# Patient Record
Sex: FEMALE | Race: WHITE | NOT HISPANIC OR LATINO | Employment: OTHER | RURAL
[De-identification: names, ages, dates, MRNs, and addresses within clinical notes are randomized per-mention and may not be internally consistent; named-entity substitution may affect disease eponyms.]

---

## 2017-01-10 ENCOUNTER — CLINICAL SUPPORT NO REQUIREMENTS (OUTPATIENT)
Dept: CARDIOLOGY | Facility: CLINIC | Age: 77
End: 2017-01-10

## 2017-01-10 DIAGNOSIS — I25.5 ISCHEMIC CARDIOMYOPATHY: Primary | ICD-10-CM

## 2017-01-10 PROCEDURE — 93283 PRGRMG EVAL IMPLANTABLE DFB: CPT | Performed by: INTERNAL MEDICINE

## 2017-05-15 ENCOUNTER — CLINICAL SUPPORT NO REQUIREMENTS (OUTPATIENT)
Dept: CARDIOLOGY | Facility: CLINIC | Age: 77
End: 2017-05-15

## 2017-05-15 DIAGNOSIS — I25.5 ISCHEMIC CARDIOMYOPATHY: Primary | ICD-10-CM

## 2017-05-15 PROCEDURE — 93295 DEV INTERROG REMOTE 1/2/MLT: CPT | Performed by: INTERNAL MEDICINE

## 2017-05-15 PROCEDURE — 93296 REM INTERROG EVL PM/IDS: CPT | Performed by: INTERNAL MEDICINE

## 2017-05-16 ENCOUNTER — TELEPHONE (OUTPATIENT)
Dept: CARDIOLOGY | Facility: CLINIC | Age: 77
End: 2017-05-16

## 2017-05-17 ENCOUNTER — TELEPHONE (OUTPATIENT)
Dept: CARDIOLOGY | Facility: CLINIC | Age: 77
End: 2017-05-17

## 2017-06-07 ENCOUNTER — OFFICE VISIT (OUTPATIENT)
Dept: CARDIOLOGY | Facility: CLINIC | Age: 77
End: 2017-06-07

## 2017-06-07 VITALS — RESPIRATION RATE: 20 BRPM | SYSTOLIC BLOOD PRESSURE: 184 MMHG | HEIGHT: 63 IN | DIASTOLIC BLOOD PRESSURE: 90 MMHG

## 2017-06-07 DIAGNOSIS — I25.10 CORONARY ARTERY DISEASE INVOLVING NATIVE CORONARY ARTERY OF NATIVE HEART WITHOUT ANGINA PECTORIS: Primary | ICD-10-CM

## 2017-06-07 DIAGNOSIS — I25.5 ISCHEMIC CARDIOMYOPATHY: ICD-10-CM

## 2017-06-07 DIAGNOSIS — Z95.810 BIVENTRICULAR ICD (IMPLANTABLE CARDIOVERTER-DEFIBRILLATOR) IN PLACE: ICD-10-CM

## 2017-06-07 DIAGNOSIS — I47.20 VENTRICULAR TACHYCARDIA (HCC): ICD-10-CM

## 2017-06-07 PROCEDURE — 99213 OFFICE O/P EST LOW 20 MIN: CPT | Performed by: INTERNAL MEDICINE

## 2017-06-12 NOTE — PROGRESS NOTES
Date of Office Visit: 2017  Encounter Provider: Antnoi Lawler MD  Place of Service: [facility] CARDIOLOGY  Patient Name: Jessa Selby  :1940    Chief complaint: Follow-up for CAD, ventricular tachycardia, ischemic   cardiomyopathy, and biventricular ICD    History of Present Illness:    Dear Laureano,       I again had the pleasure of seeing your patient in cardiology office on 2017. As you   well know, she is a very pleasant 76 year-old white female with a past medical history   significant for coronary artery disease, ischemic cardiomyopathy, ventricular tachycardia,   and mitral valve replacement who presents for follow-up. The patient has an extensive   cardiovascular history. She had previously been followed in [place] at [facility] for   most of her care. She established care with me in 2016. She has an extensive history   of coronary artery disease, with her first myocardial infarction in . She had stated that   she had 13 stents since that time. She also has a significant ischemic cardiomyopathy,   with an ejection fraction of 25-30% and severely dilated left ventricle by echocardiogram   in 2014. She did also undergo a tissue mitral valve replacement on 2009, at   which time she had a #29 St. Derian Epic tissue valve placed. She also underwent   placement of a biventricular ICD on 2009, which is a [employer] model N119.   She also has evidently had recurrent ventricular tachycardia in the past, and has been on   amiodarone for quite some time.       The patient presents today for follow-up.  She still has significant pain issues and her   right hip, but has been getting injections.  She does state that she has had some   orthopnea and smothering at times when lying recumbent.  Her blood pressure is up   today, although she states that this has not been the case at home.  Her defibrillator was   checked on 2017, and  no changes were made.  She denied chest pain.     Past Medical History:   Diagnosis Date   • Biventricular ICD (implantable cardioverter-defibrillator) in place     [employer] [employer] model N119 (6/8/09)   • Coronary artery disease     Multiple stents in past.  MI in 2004.   • GERD (gastroesophageal reflux disease)    • Hypercholesterolemia    • Hypertension    • Hypothyroidism    • Ischemic cardiomyopathy     Markedly dilated LV with EF 25-30% by echo on 1/14/14   • Myocardial infarction    • Ventricular tachycardia     Recurrent VT - on Amio       Past Surgical History:   Procedure Laterality Date   • APPENDECTOMY     • CHOLECYSTECTOMY     • HYSTERECTOMY     • TONSILLECTOMY         Current Outpatient Prescriptions on File Prior to Visit   Medication Sig Dispense Refill   • amiodarone (PACERONE) 200 MG tablet Daily.     • aspirin 325 MG tablet Take 325 mg by mouth daily.     • carvedilol (COREG) 3.125 MG tablet 3.125 mg 2 (Two) Times a Day With Meals.     • clopidogrel (PLAVIX) 75 MG tablet      • FLUoxetine (PROzac) 20 MG capsule Take 20 mg by mouth daily.     • HYDROcodone-acetaminophen (NORCO)  MG per tablet Every 6 (Six) Hours As Needed.     • Multiple Vitamin (MULTI VITAMIN) tablet Take 1 tablet by mouth Daily.     • pravastatin (PRAVACHOL) 80 MG tablet Take 80 mg by mouth daily.     • spironolactone (ALDACTONE) 25 MG tablet      • levothyroxine (SYNTHROID, LEVOTHROID) 50 MCG tablet Daily.       No current facility-administered medications on file prior to visit.      Allergies as of 06/07/2017   • (No Known Allergies)     Social History     Social History   • Marital status:      Spouse name: N/A   • Number of children: N/A   • Years of education: N/A     Occupational History   • Not on file.     Social History Main Topics   • Smoking status: Never Smoker   • Smokeless tobacco: Never Used   • Alcohol use No   • Drug use: No   • Sexual activity: Not on file     Other Topics Concern   • Not on file  "    Social History Narrative     Family History   Problem Relation Age of Onset   • Atrial fibrillation Father    • Coronary artery disease Father    • Diabetes Father        Review of Systems   Constitution: Positive for weakness and malaise/fatigue.   Cardiovascular: Positive for dyspnea on exertion and orthopnea.   Respiratory: Positive for shortness of breath.    Musculoskeletal: Positive for back pain, joint pain and muscle weakness.   All other systems reviewed and are negative.    Objective:     Vitals:    06/07/17 1331   BP: (!) 184/90   Resp: 20   Height: 63\" (160 cm)     There is no height or weight on file to calculate BMI.    Physical Exam   Constitutional: She is oriented to person, place, and time. She appears well-developed.   In wheelchair.  Chronically ill appearance.   HENT:   Head: Normocephalic and atraumatic.   Eyes: Conjunctivae are normal.   Neck: Neck supple.   Cardiovascular: Normal rate and regular rhythm.  Exam reveals no friction rub.    Murmur heard.   Systolic murmur is present with a grade of 2/6  at the upper left sternal border  Pulmonary/Chest: Effort normal and breath sounds normal. She has no rales.   Abdominal: Soft. There is no tenderness.   Musculoskeletal: She exhibits no edema.   Neurological: She is alert and oriented to person, place, and time.   Skin: Skin is warm.   Psychiatric: She has a normal mood and affect. Her behavior is normal.     Lab Review:   Procedures    Cardiac Procedures:  1. Status post placement of multiple stents, starting in 2004 (details not available).   2. Status post tissue mitral valve replacement with a #29 St. Derian Epic tissue mitral   valve on 05/27/2009.   3. Status post biventricular ICD on 06/08/2009. The patient has a [employer]   model N119.   4. Lexiscan Myoview stress test on 01/14/2014 revealed extensive scarring of the   apex, lateral wall and small portions of the septum. There was a subtle area of   ischemia in the inferior " wall.   5. Echocardiogram on 01/14/2014 revealed her left ventricle to be markedly dilated.   The apex was dyskinetic. They lateral wall and septum were hypokinetic. The  ejection fraction was 25-30%. The mitral valve replacement was functioning normally.     Assessment:       Diagnosis Plan   1. Coronary artery disease involving native coronary artery of native heart without angina pectoris     2. Ischemic cardiomyopathy     3. Biventricular ICD (implantable cardioverter-defibrillator) in place     4. Ventricular tachycardia       Plan:       The patient has had some orthopnea and shortness of breath, although this seems to   be fairly at baseline for her.  She may need a diuretic other than spironolactone if this   continues or worsens.  I am going to check liver function tests and thyroid function   tests at this time given the amiodarone use.  Again, she has had severe ventricular   tachycardia in the past, and needs the amiodarone if at all possible.  Her ICD was   recently checked on 5/22/2017, and no changes were made.  She did not have any   ventricular tachycardia noted at that time.  She will need a chest x-ray and pulmonary   function test later this year, again because of the amiodarone use.  She is stable from   a coronary disease standpoint, will remain on the aspirin, Plavix, pravastatin, and   Coreg.  Her blood pressure has actually been good at home, and I feel that this is an   outlier today.  I will see her back in 6 months, and she will call in the meantime with   any issues.    Coronary Artery Disease  Assessment  • The patient has no angina    Plan  • Lifestyle modifications discussed include adhering to a heart healthy diet, avoidance of tobacco products, maintenance of a healthy weight, medication compliance, regular exercise and regular monitoring of cholesterol and blood pressure    Subjective - Objective  • There has been a previous stent procedure using DANIELLE  • Current antiplatelet therapy  includes aspirin 325 mg and clopidogrel 75 mg    Heart Failure  Assessment  • NYHA class III-A - There is limitation of physical activity. The patient is comfortable at rest, but ordinary activity causes fatigue, palpitations or shortness of breath.  • ACE inhibitor not prescribed for medical reasons  • Beta blocker prescribed  • Diuretics prescribed  • The most recent ejection fraction is 25%  • Left ventricular function is severely reduced by qualitative assessment  • The left ventricle was last assessed on 1/14/2014    Plan  • The patient has received heart failure education on the following topics: dietary sodium restriction, medication instructions, minimizing or avoiding NSAID use, physical activity and minimizing alcohol intake  • The heart failure care plan was discussed with the patient today including: continuing the current program    Subjective/Objective  • The patient reports dyspnea and orthopnea    • Physical exam findings negative for rales.  • The patient has an ICD implanted on 6/8/2009

## 2017-08-12 ENCOUNTER — CLINICAL SUPPORT NO REQUIREMENTS (OUTPATIENT)
Dept: CARDIOLOGY | Facility: CLINIC | Age: 77
End: 2017-08-12

## 2017-08-22 ENCOUNTER — TELEPHONE (OUTPATIENT)
Dept: CARDIOLOGY | Facility: CLINIC | Age: 77
End: 2017-08-22

## 2017-08-22 NOTE — TELEPHONE ENCOUNTER
The patient needs to reschedule her appointment as she does not have a ride to get to the one on 8/30

## 2017-11-13 ENCOUNTER — CLINICAL SUPPORT NO REQUIREMENTS (OUTPATIENT)
Dept: CARDIOLOGY | Facility: CLINIC | Age: 77
End: 2017-11-13

## 2017-11-13 DIAGNOSIS — I25.5 ISCHEMIC CARDIOMYOPATHY: Primary | ICD-10-CM

## 2017-11-13 PROCEDURE — 93296 REM INTERROG EVL PM/IDS: CPT | Performed by: INTERNAL MEDICINE

## 2017-11-13 PROCEDURE — 93295 DEV INTERROG REMOTE 1/2/MLT: CPT | Performed by: INTERNAL MEDICINE

## 2017-11-14 ENCOUNTER — TELEPHONE (OUTPATIENT)
Dept: CARDIOLOGY | Facility: CLINIC | Age: 77
End: 2017-11-14

## 2017-11-14 NOTE — TELEPHONE ENCOUNTER
Pt has af episodes recorded.  There are a total of 38 new episodes since last remote check (8/12/17).  The longest on the log that is new is 49 min on 8/30/17.  There are 7 episodes since 8/12/17 that have been between 1 and 24 hours in duration.  She has been in af 9.7 days since 1/10/17.  It is noted that back in May she was out of rhythm 5/22-5/27/17.  Per diagnostics 19.8 hr total on 8/29 and 18.8 hrs on 8/30.  I do not see where you have been previously notified of af and pt was unaware as well.  She has an apt with you in Dec at [facility] office.  She confirms she is on amiodarone, coreg and asa.  She has a biv icd that is set to rv pace only.  She is karen for her next device check in Feb at the [facility] office.

## 2017-11-15 NOTE — TELEPHONE ENCOUNTER
I called and spoke with her on the phone.  I am going to have her stop her Plavix, and start Eliquis 5 mg bid.  Her daughter is going to  the samples tomorrow.  Thanks    BOGDAN

## 2017-11-16 NOTE — TELEPHONE ENCOUNTER
Does her med list need update now or does this happen at her next ov.  I checked and med list still has plavix on it and Eliquis is not on it

## 2017-11-17 NOTE — TELEPHONE ENCOUNTER
I called pt. And offered a [facility] appt. On 12/22/17, but she did not accept.  Said she would talk to her daughter and call me back. Am

## 2017-12-14 ENCOUNTER — TELEPHONE (OUTPATIENT)
Dept: CARDIOLOGY | Facility: CLINIC | Age: 77
End: 2017-12-14

## 2017-12-14 NOTE — TELEPHONE ENCOUNTER
Patient is out of Eliquis and she doesn't see you until the 20th.  Her med list was never updated.  Can I fill Eliquis for her?      Thanks,  Birdie    [phone]-Patent's number

## 2017-12-20 ENCOUNTER — OFFICE VISIT (OUTPATIENT)
Dept: CARDIOLOGY | Facility: CLINIC | Age: 77
End: 2017-12-20

## 2017-12-20 VITALS
BODY MASS INDEX: 34.54 KG/M2 | HEART RATE: 60 BPM | SYSTOLIC BLOOD PRESSURE: 160 MMHG | DIASTOLIC BLOOD PRESSURE: 72 MMHG | WEIGHT: 195 LBS

## 2017-12-20 DIAGNOSIS — I25.5 ISCHEMIC CARDIOMYOPATHY: ICD-10-CM

## 2017-12-20 DIAGNOSIS — I47.20 VENTRICULAR TACHYCARDIA (HCC): ICD-10-CM

## 2017-12-20 DIAGNOSIS — I25.10 CORONARY ARTERY DISEASE INVOLVING NATIVE CORONARY ARTERY OF NATIVE HEART WITHOUT ANGINA PECTORIS: Primary | ICD-10-CM

## 2017-12-20 DIAGNOSIS — Z95.810 ICD (IMPLANTABLE CARDIOVERTER-DEFIBRILLATOR) IN PLACE: ICD-10-CM

## 2017-12-20 DIAGNOSIS — R60.0 LOCALIZED EDEMA: ICD-10-CM

## 2017-12-20 DIAGNOSIS — I48.0 PAROXYSMAL ATRIAL FIBRILLATION (HCC): ICD-10-CM

## 2017-12-20 PROCEDURE — 99214 OFFICE O/P EST MOD 30 MIN: CPT | Performed by: INTERNAL MEDICINE

## 2017-12-23 RX ORDER — POTASSIUM CHLORIDE 750 MG/1
10 TABLET, FILM COATED, EXTENDED RELEASE ORAL DAILY PRN
Qty: 30 TABLET | Refills: 3 | Status: SHIPPED | OUTPATIENT
Start: 2017-12-23 | End: 2018-09-15 | Stop reason: SDUPTHER

## 2017-12-23 RX ORDER — FUROSEMIDE 20 MG/1
20 TABLET ORAL DAILY PRN
Qty: 30 TABLET | Refills: 3 | Status: SHIPPED | OUTPATIENT
Start: 2017-12-23

## 2017-12-23 NOTE — PROGRESS NOTES
Date of Office Visit: 2017  Encounter Provider: Antoni Lawler MD  Place of Service: [facility] CARDIOLOGY  Patient Name: Jessa Selby  :1940    Chief complaint: Follow-up for CAD, ventricular tachycardia, ischemic   cardiomyopathy, biventricular ICD, and paroxysmal atrial fibrillation.    History of Present Illness:    Dear Laureano,       I again had the pleasure of seeing your patient in cardiology office on 2017. As you   well know, she is a very pleasant 77 year-old white female with a past medical history   significant for coronary artery disease, ischemic cardiomyopathy, ventricular tachycardia,   and mitral valve replacement who presents for follow-up. The patient has an extensive   cardiovascular history. She had previously been followed in [place] at [facility] for   most of her care. She established care with me in 2016. She has an extensive history   of coronary artery disease, with her first myocardial infarction in . She had stated that   she had 13 stents since that time. She also has a significant ischemic cardiomyopathy,   with an ejection fraction of 25-30% and severely dilated left ventricle by echocardiogram   in 2014. She did also undergo a tissue mitral valve replacement on 2009, at   which time she had a #29 St. Derian Epic tissue valve placed. She also underwent   placement of a biventricular ICD on 2009, which is a [place] [employer] model N119.   She also has had recurrent ventricular tachycardia in the past, and has been on   amiodarone for quite some time.  She was diagnosed with paroxysmal atrial fibrillation by   device interrogation in 2017.  She was placed on Eliquis at that point.      The patient presents today for follow-up.  Overall, she is stable.  Again, she was diagnosed   with paroxysmal atrial fibrillation on her device interrogation approximately 6 weeks ago.    She has been on Eliquis  since that time, and has not had any major issues with this.  She   does have more lower extremity edema recently.  Her shortness of breath is stable.  She   has not had any chest pain.    Past Medical History:   Diagnosis Date   • Atrial fibrillation     Diagnosed by device interrogation in 11/17   • Biventricular ICD (implantable cardioverter-defibrillator) in place     [employer] model N119 (6/8/09)   • Coronary artery disease     Multiple stents in past.  MI in 2004.   • GERD (gastroesophageal reflux disease)    • Hypercholesterolemia    • Hypertension    • Hypothyroidism    • Ischemic cardiomyopathy     Markedly dilated LV with EF 25-30% by echo on 1/14/14   • Myocardial infarction    • Ventricular tachycardia     Recurrent VT - on Amio       Past Surgical History:   Procedure Laterality Date   • APPENDECTOMY     • CHOLECYSTECTOMY     • HYSTERECTOMY     • TONSILLECTOMY         Current Outpatient Prescriptions on File Prior to Visit   Medication Sig Dispense Refill   • amiodarone (PACERONE) 200 MG tablet Daily.     • apixaban (ELIQUIS) 5 MG tablet tablet Take 1 tablet by mouth 2 (Two) Times a Day. 60 tablet 0   • aspirin 325 MG tablet Take 325 mg by mouth daily.     • carvedilol (COREG) 3.125 MG tablet 3.125 mg 2 (Two) Times a Day With Meals.     • HYDROcodone-acetaminophen (NORCO)  MG per tablet Every 6 (Six) Hours As Needed.     • levothyroxine (SYNTHROID, LEVOTHROID) 50 MCG tablet Daily.     • Multiple Vitamin (MULTI VITAMIN) tablet Take 1 tablet by mouth Daily.     • spironolactone (ALDACTONE) 25 MG tablet        No current facility-administered medications on file prior to visit.      Allergies as of 12/20/2017   • (No Known Allergies)     Social History     Social History   • Marital status:      Spouse name: N/A   • Number of children: N/A   • Years of education: N/A     Occupational History   • Not on file.     Social History Main Topics   • Smoking status: Never Smoker   • Smokeless  tobacco: Never Used   • Alcohol use No   • Drug use: No   • Sexual activity: Not on file     Other Topics Concern   • Not on file     Social History Narrative     Family History   Problem Relation Age of Onset   • Atrial fibrillation Father    • Coronary artery disease Father    • Diabetes Father        Review of Systems   Constitution: Positive for malaise/fatigue.   Cardiovascular: Positive for dyspnea on exertion and leg swelling.   All other systems reviewed and are negative.    Objective:     Vitals:    12/20/17 1313   BP: 160/72   Pulse: 60   Weight: 88.5 kg (195 lb)     Body mass index is 34.54 kg/(m^2).    Physical Exam   Constitutional: She is oriented to person, place, and time. She appears well-developed and well-nourished.   HENT:   Head: Normocephalic and atraumatic.   Eyes: Conjunctivae are normal.   Neck: Neck supple.   Cardiovascular: Normal rate and regular rhythm.  Exam reveals no friction rub.    Murmur heard.   Systolic murmur is present with a grade of 2/6  at the upper left sternal border, lower left sternal border  Pulmonary/Chest: Effort normal and breath sounds normal. She has no rales.   Abdominal: Soft. There is no tenderness.   Musculoskeletal:   1+ edema lower extremities bilaterally.    Neurological: She is alert and oriented to person, place, and time.   Skin: Skin is warm.   Psychiatric: She has a normal mood and affect. Her behavior is normal.     Lab Review:   Procedures    Cardiac Procedures:  1. Status post placement of multiple stents, starting in 2004 (details not available).   2. Status post tissue mitral valve replacement with a #29 St. Derian Epic tissue mitral   valve on 05/27/2009.   3. Status post biventricular ICD on 06/08/2009. The patient has a [employer]   model N119.   4. Lexiscan Myoview stress test on 01/14/2014 revealed extensive scarring of the   apex, lateral wall and small portions of the septum. There was a subtle area of   ischemia in the inferior wall.    5. Echocardiogram on 01/14/2014 revealed her left ventricle to be markedly dilated.   The apex was dyskinetic. They lateral wall and septum were hypokinetic. The  ejection fraction was 25-30%. The mitral valve replacement was functioning normally.     Assessment:       Diagnosis Plan   1. Coronary artery disease involving native coronary artery of native heart without angina pectoris     2. Paroxysmal atrial fibrillation  Adult Transthoracic Echo Complete W/ Cont if Necessary Per Protocol   3. ICD (implantable cardioverter-defibrillator) in place     4. Ischemic cardiomyopathy  Adult Transthoracic Echo Complete W/ Cont if Necessary Per Protocol   5. Ventricular tachycardia     6. Localized edema  Adult Transthoracic Echo Complete W/ Cont if Necessary Per Protocol     Plan:       Again, the patient's most recent issue has been paroxysmal atrial fibrillation.  She is   asymptomatic with regards to this, and this was found on her device interrogation.  She   is taking the Eliquis 5 mg twice a day without any difficulty, and no bleeding.  I am going   to continue her on this.  I gave her multiple samples today, and evidently she is going to   require preauthorization for this from her insurance.  She will continue on the carvedilol   at 3.125 mg twice a day.  She is stable from a coronary artery disease standpoint, and   will continue on the aspirin at 81 mg per day.  She is taking pravastatin without any   difficulty.  She has had some worsening lower extremity edema, and I am going to give   her Lasix 20 mg on an as-needed basis.  I will also check an echocardiogram at this   point as it has been quite some time since I have checked 1 on her.  I encouraged her   to follow her blood pressure, and let me know if it is running high at home.  I recently   checked labs on her in June 2017, and her liver function and thyroid function were   normal on the amiodarone.  This will need to be rechecked in the next several  months.    She has had renal insufficiency with Ace inhibitors in the past.  Again, she does take   the amiodarone for a history of ventricular tachycardia.  I will have her follow-up with   me in 3 months unless other issues arise.    Atrial Fibrillation and Atrial Flutter  Assessment  • The patient has paroxysmal atrial fibrillation  • This is non-valvular in etiology  • The patient's CHADS2-VASc score is 5  • A [id number] score of 2 or more is considered a high risk for a thromboembolic event  • Apixaban prescribed    Plan  • Attempt to maintain sinus rhythm  • Continue apixaban for antithrombotic therapy, bleeding issues discussed  • Continue beta blocker for rhythm control    Subjective - Objective  • The average duration of atrial fibrillation episodes is <48 hours    Coronary Artery Disease  Assessment  • The patient has no angina    Plan  • Lifestyle modifications discussed include adhering to a heart healthy diet, avoidance of tobacco products, maintenance of a healthy weight, medication compliance, regular exercise and regular monitoring of cholesterol and blood pressure    Subjective - Objective  • There is a history of past MI on or around 1/1/2004  • There has been a previous stent procedure using DANIELLE  • Current antiplatelet therapy includes aspirin 81 mg    Heart Failure  Assessment  • NYHA class III-A - There is limitation of physical activity. The patient is comfortable at rest, but ordinary activity causes fatigue, palpitations or shortness of breath.  • ACE inhibitor not prescribed for medical reasons  • Beta blocker prescribed  • Diuretics prescribed  • The most recent ejection fraction is 25%  • Left ventricular function is severely reduced by qualitative assessment  • The left ventricle was last assessed on 1/14/2014    Plan  • The patient has received heart failure education on the following topics: dietary sodium restriction, medication instructions, minimizing or avoiding NSAID use, symptom  management, physical activity, weight monitoring and minimizing alcohol intake  • The heart failure care plan was discussed with the patient today including: continuing the current program    Subjective/Objective  • Physical exam findings positive for peripheral edema.   • Physical exam findings negative for rales.  • The patient has an ICD implanted on 6/8/2009

## 2018-02-16 ENCOUNTER — CLINICAL SUPPORT NO REQUIREMENTS (OUTPATIENT)
Dept: CARDIOLOGY | Facility: CLINIC | Age: 78
End: 2018-02-16

## 2018-02-16 DIAGNOSIS — I25.5 ISCHEMIC CARDIOMYOPATHY: Primary | ICD-10-CM

## 2018-02-16 PROCEDURE — 93283 PRGRMG EVAL IMPLANTABLE DFB: CPT | Performed by: INTERNAL MEDICINE

## 2018-06-22 RX ORDER — APIXABAN 5 MG/1
TABLET, FILM COATED ORAL
Qty: 60 TABLET | Refills: 0 | Status: SHIPPED | OUTPATIENT
Start: 2018-06-22 | End: 2019-10-23 | Stop reason: SDUPTHER

## 2018-09-17 RX ORDER — POTASSIUM CHLORIDE 750 MG/1
10 TABLET, FILM COATED, EXTENDED RELEASE ORAL DAILY
Qty: 30 TABLET | Refills: 0 | Status: SHIPPED | OUTPATIENT
Start: 2018-09-17 | End: 2018-10-11 | Stop reason: SDUPTHER

## 2018-10-11 RX ORDER — POTASSIUM CHLORIDE 750 MG/1
10 TABLET, FILM COATED, EXTENDED RELEASE ORAL DAILY
Qty: 90 TABLET | Refills: 0 | Status: SHIPPED | OUTPATIENT
Start: 2018-10-11 | End: 2019-01-31 | Stop reason: SDUPTHER

## 2018-11-09 ENCOUNTER — CLINICAL SUPPORT NO REQUIREMENTS (OUTPATIENT)
Dept: CARDIOLOGY | Facility: CLINIC | Age: 78
End: 2018-11-09

## 2018-11-09 DIAGNOSIS — I25.5 ISCHEMIC CARDIOMYOPATHY: Primary | ICD-10-CM

## 2018-11-09 PROCEDURE — 93283 PRGRMG EVAL IMPLANTABLE DFB: CPT | Performed by: INTERNAL MEDICINE

## 2019-01-31 RX ORDER — POTASSIUM CHLORIDE 750 MG/1
10 TABLET, FILM COATED, EXTENDED RELEASE ORAL DAILY
Qty: 90 TABLET | Refills: 0 | Status: SHIPPED | OUTPATIENT
Start: 2019-01-31 | End: 2019-07-28 | Stop reason: SDUPTHER

## 2019-06-07 ENCOUNTER — CLINICAL SUPPORT NO REQUIREMENTS (OUTPATIENT)
Dept: CARDIOLOGY | Facility: CLINIC | Age: 79
End: 2019-06-07

## 2019-06-07 DIAGNOSIS — I50.9 CONGESTIVE HEART FAILURE, UNSPECIFIED HF CHRONICITY, UNSPECIFIED HEART FAILURE TYPE (HCC): Primary | ICD-10-CM

## 2019-06-07 PROCEDURE — 93283 PRGRMG EVAL IMPLANTABLE DFB: CPT | Performed by: INTERNAL MEDICINE

## 2019-07-29 RX ORDER — POTASSIUM CHLORIDE 750 MG/1
10 TABLET, FILM COATED, EXTENDED RELEASE ORAL DAILY
Qty: 90 TABLET | Refills: 0 | Status: SHIPPED | OUTPATIENT
Start: 2019-07-29 | End: 2019-10-24 | Stop reason: SDUPTHER

## 2019-10-23 ENCOUNTER — OFFICE VISIT (OUTPATIENT)
Dept: CARDIOLOGY | Facility: CLINIC | Age: 79
End: 2019-10-23

## 2019-10-23 VITALS
WEIGHT: 197.2 LBS | DIASTOLIC BLOOD PRESSURE: 64 MMHG | BODY MASS INDEX: 34.94 KG/M2 | SYSTOLIC BLOOD PRESSURE: 128 MMHG | HEART RATE: 67 BPM | HEIGHT: 63 IN

## 2019-10-23 DIAGNOSIS — I47.29 VENTRICULAR TACHYCARDIA (PAROXYSMAL) (HCC): ICD-10-CM

## 2019-10-23 DIAGNOSIS — I25.10 CORONARY ARTERY DISEASE INVOLVING NATIVE CORONARY ARTERY OF NATIVE HEART WITHOUT ANGINA PECTORIS: Primary | ICD-10-CM

## 2019-10-23 DIAGNOSIS — Z95.810 PRESENCE OF BIVENTRICULAR IMPLANTABLE CARDIOVERTER-DEFIBRILLATOR (ICD): ICD-10-CM

## 2019-10-23 DIAGNOSIS — I25.5 ISCHEMIC CARDIOMYOPATHY: ICD-10-CM

## 2019-10-23 DIAGNOSIS — I48.0 PAROXYSMAL ATRIAL FIBRILLATION (HCC): ICD-10-CM

## 2019-10-23 DIAGNOSIS — Z95.3 STATUS POST MITRAL VALVE REPLACEMENT WITH TISSUE VALVE: ICD-10-CM

## 2019-10-23 PROCEDURE — 99213 OFFICE O/P EST LOW 20 MIN: CPT | Performed by: INTERNAL MEDICINE

## 2019-10-23 PROCEDURE — 93000 ELECTROCARDIOGRAM COMPLETE: CPT | Performed by: INTERNAL MEDICINE

## 2019-10-24 RX ORDER — POTASSIUM CHLORIDE 750 MG/1
10 TABLET, FILM COATED, EXTENDED RELEASE ORAL DAILY
Qty: 90 TABLET | Refills: 2 | Status: SHIPPED | OUTPATIENT
Start: 2019-10-24

## 2019-10-24 NOTE — PROGRESS NOTES
Date of Office Visit: 10/23/2019  Encounter Provider: Antoni Lawler MD  Place of Service: [facility]  Patient Name: Jessa Selby  :1940    Chief complaint: Follow-up for CAD, ventricular tachycardia, ischemic   cardiomyopathy, biventricular ICD, tissue mitral valve replacement, and   paroxysmal atrial fibrillation.    History of Present Illness:    Dear Laureano,       I again had the pleasure of seeing your patient in cardiology office on 10/23/2019.  As you   well know, she is a very pleasant 79 year-old white female with a past medical history   significant for coronary artery disease, ischemic cardiomyopathy, ventricular tachycardia,   and mitral valve replacement who presents for follow-up. The patient has an extensive   cardiovascular history. She had previously been followed in [place] at [facility] for   most of her care. She established care with me in 2016. She has an extensive history   of coronary artery disease, with her first myocardial infarction in . She had stated that   she had 13 stents since that time. She also has a significant ischemic cardiomyopathy,   with an ejection fraction of 25-30% and severely dilated left ventricle by echocardiogram   in 2014. She did also undergo a tissue mitral valve replacement on 2009, at   which time she had a #29 [employer] Epic tissue valve placed. She also underwent   placement of a biventricular ICD on 2009, which is a [employer] model N119.   She also has had recurrent ventricular tachycardia in the past, and has been on   amiodarone for quite some time. She was diagnosed with paroxysmal atrial fibrillation by   device interrogation in 2017.  She was placed on Eliquis at that point.      The patient presents today for follow-up.  She was admitted in May 2019 with a urinary   tract infection, pneumonia, and congestive heart failure.  It did take her quite some time   to  recover from this, although she is doing better now.  She is still very short of breath   with any exertion, although she states this has not changed.  She states that a lot of   this is very likely secondary to some deconditioning as she has mobility issues.  She   has not had any chest pain.  She is still taking the Eliquis without any difficulty.     Past Medical History:   Diagnosis Date   • Atrial fibrillation (CMS/HCC)     Diagnosed by device interrogation in 11/17   • Biventricular ICD (implantable cardioverter-defibrillator) in place     [employer] model N119 (6/8/09)   • Coronary artery disease     Multiple stents in past.  MI in 2004.   • GERD (gastroesophageal reflux disease)    • Hypercholesterolemia    • Hypertension    • Hypothyroidism    • Ischemic cardiomyopathy     Markedly dilated LV with EF 25-30% by echo on 1/14/14   • Myocardial infarction (CMS/HCC)    • Ventricular tachycardia (CMS/HCC)     Recurrent VT - on Amio       Past Surgical History:   Procedure Laterality Date   • APPENDECTOMY     • CHOLECYSTECTOMY     • HYSTERECTOMY     • TONSILLECTOMY         Current Outpatient Medications on File Prior to Visit   Medication Sig Dispense Refill   • amiodarone (PACERONE) 200 MG tablet Daily.     • apixaban (ELIQUIS) 5 MG tablet tablet Take 1 tablet by mouth 2 (Two) Times a Day. 60 tablet 3   • aspirin 81 MG tablet Take 81 mg by mouth Daily.     • carvedilol (COREG) 3.125 MG tablet 3.125 mg 2 (Two) Times a Day With Meals.     • furosemide (LASIX) 20 MG tablet Take 1 tablet by mouth Daily As Needed (Edema). 30 tablet 3   • HYDROcodone-acetaminophen (NORCO)  MG per tablet Every 6 (Six) Hours As Needed.     • Multiple Vitamin (MULTI VITAMIN) tablet Take 1 tablet by mouth Daily.     • potassium chloride (KLOR-CON) 10 MEQ CR tablet Take 1 tablet by mouth Daily. *NEEDS TO CONTACT OFFICE FOR AN APPOINTMENT. MUST BE SEEN TO RECEIVE  MORE REFILLS 90 tablet 0   • spironolactone (ALDACTONE) 25 MG  "tablet      • [DISCONTINUED] ELIQUIS 5 MG tablet tablet TAKE 1 TABLET BY MOUTH TWICE A DAY 60 tablet 0   • [DISCONTINUED] levothyroxine (SYNTHROID, LEVOTHROID) 50 MCG tablet Daily.       No current facility-administered medications on file prior to visit.      Allergies as of 10/23/2019   • (No Known Allergies)     Social History     Socioeconomic History   • Marital status:      Spouse name: Not on file   • Number of children: Not on file   • Years of education: Not on file   • Highest education level: Not on file   Tobacco Use   • Smoking status: Never Smoker   • Smokeless tobacco: Never Used   Substance and Sexual Activity   • Alcohol use: No   • Drug use: No     Family History   Problem Relation Age of Onset   • Atrial fibrillation Father    • Coronary artery disease Father    • Diabetes Father        Review of Systems   Constitution: Positive for malaise/fatigue.   Cardiovascular: Positive for dyspnea on exertion, leg swelling and palpitations.   Musculoskeletal: Positive for joint pain and joint swelling.   All other systems reviewed and are negative.     Objective:     Vitals:    10/23/19 1452   BP: 128/64   Pulse: 67   Weight: 89.4 kg (197 lb 3.2 oz)   Height: 160 cm (63\")     Body mass index is 34.93 kg/m².    Physical Exam   Constitutional: She is oriented to person, place, and time. She appears well-developed and well-nourished.   HENT:   Head: Normocephalic and atraumatic.   Eyes: Conjunctivae are normal.   Neck: Neck supple.   Cardiovascular: Normal rate and regular rhythm. Exam reveals no gallop and no friction rub.   Murmur heard.   Systolic murmur is present with a grade of 2/6 at the upper left sternal border and lower left sternal border.  Pulmonary/Chest: Effort normal and breath sounds normal.   Abdominal: Soft. There is no tenderness.   Musculoskeletal:   1+ edema of the lower extremities bilaterally   Neurological: She is alert and oriented to person, place, and time.   Skin: Skin is " warm.   Psychiatric: She has a normal mood and affect. Her behavior is normal.     Lab Review:     ECG 12 Lead  Date/Time: 10/23/2019 2:54 PM  Performed by: Antoni Lawler MD  Authorized by: Antoni Lawler MD   Comparison: not compared with previous ECG   Previous ECG: no previous ECG available  Rhythm comments: Atrial pacing and occasional ventricular pacing  Conduction: non-specific intraventricular conduction delay    Clinical impression: abnormal EKG            Cardiac Procedures:  1. Status post placement of multiple stents, starting in 2004 (details not available).   2. Status post tissue mitral valve replacement with a #29 [employer]ian Epic tissue mitral   valve on 5/27/2009.   3. Status post biventricular ICD on 6/8/2009. The patient has a [employer]   model N119.   4. Lexiscan Myoview stress test on 1/14/2014 revealed extensive scarring of the   apex, lateral wall and small portions of the septum. There was a subtle area of   ischemia in the inferior wall.   5. Echocardiogram on 1/14/2014 revealed her left ventricle to be markedly dilated.   The apex was dyskinetic. They lateral wall and septum were hypokinetic. The  ejection fraction was 25-30%. The mitral valve replacement was functioning normally.   6.  Echocardiogram on 1/22/2018: There was severe inferolateral wall hypokinesis.    The ejection fraction was 30 to 35%.  There was a normal functioning bioprosthetic   mitral valve.  There was mild tricuspid regurgitation.    Assessment:       Diagnosis Plan   1. Coronary artery disease involving native coronary artery of native heart without angina pectoris     2. Ventricular tachycardia (paroxysmal) (CMS/HCC)     3. Ischemic cardiomyopathy     4. Presence of biventricular implantable cardioverter-defibrillator (ICD)     5. Paroxysmal atrial fibrillation (CMS/HCC)     6. Status post mitral valve replacement with tissue valve       Plan:       Although she has many cardiac issues, she seems  to be fairly stable at this time.  She did have a urinary tract infection, pneumonia, and heart failure in May 2019.  However, she is largely back in her baseline.  She has not had any bleeding issues with the Eliquis, and she will continue on the 5 mg twice a day dose.  She will continue on the Coreg at 3.125 mg twice a day.  She will continue on the aspirin 81 mg/day, as well as the pravastatin for her coronary artery disease.  She will continue to take the Lasix at 20 mg/day as needed for the lower extremity edema.  Her last echocardiogram on 1/22/2018 showed a stable ejection fraction of approximately 30 to 35%.  She is taking amiodarone at 200 mg/day, and has not had any recent ventricular tachycardia.  I will need to check liver function tests, thyroid function tests, and pulmonary function tests at some point soon.  She has had renal insufficiency with ACE inhibitors in the past, although she is on lisinopril at 5 mg/day and tolerating this.  She is also taking spironolactone at 25 mg/day and tolerating this.  I will plan on seeing her back in the office in 6 months unless other issues arise.

## 2019-11-21 ENCOUNTER — TELEPHONE (OUTPATIENT)
Dept: CARDIOLOGY | Facility: CLINIC | Age: 79
End: 2019-11-21

## 2019-11-21 NOTE — TELEPHONE ENCOUNTER
11/21/19  3:10  [facility] left by Kailey Perry.  Calling to inform Dr. Lawler that the patient will be going into [facility].  The family is asking Dr. Lawler to call and speak with Flavia, [phone], at this hospice center, so she can review and explain how the hospice care with them will operate.  If you have other questions, she said to call Jessa at [phone]/darlyn

## 2019-11-21 NOTE — TELEPHONE ENCOUNTER
I spoke with oTña ( daughter)  I let her know Dr Lawler is aware of how Hospice works.  I let her know that Hospice can always call our office with any questions about pt's medication or care.  She will call if she has any questions or concerns or if any issues arise.   Damian MILLER